# Patient Record
Sex: MALE | Race: WHITE | NOT HISPANIC OR LATINO | URBAN - METROPOLITAN AREA
[De-identification: names, ages, dates, MRNs, and addresses within clinical notes are randomized per-mention and may not be internally consistent; named-entity substitution may affect disease eponyms.]

---

## 2020-12-15 ENCOUNTER — OFFICE VISIT (OUTPATIENT)
Dept: URBAN - METROPOLITAN AREA CLINIC 63 | Facility: CLINIC | Age: 75
End: 2020-12-15

## 2021-01-12 ENCOUNTER — OFFICE VISIT (OUTPATIENT)
Dept: URBAN - METROPOLITAN AREA SURGERY CENTER 4 | Facility: SURGERY CENTER | Age: 76
End: 2021-01-12

## 2021-01-27 ENCOUNTER — OFFICE VISIT (OUTPATIENT)
Dept: URBAN - METROPOLITAN AREA CLINIC 63 | Facility: CLINIC | Age: 76
End: 2021-01-27

## 2021-02-05 ENCOUNTER — OFFICE VISIT (OUTPATIENT)
Dept: URBAN - METROPOLITAN AREA CLINIC 63 | Facility: CLINIC | Age: 76
End: 2021-02-05

## 2021-07-07 ENCOUNTER — OFFICE VISIT (OUTPATIENT)
Dept: URBAN - METROPOLITAN AREA MEDICAL CENTER 14 | Facility: MEDICAL CENTER | Age: 76
End: 2021-07-07

## 2021-09-01 ENCOUNTER — OFFICE VISIT (OUTPATIENT)
Dept: URBAN - METROPOLITAN AREA MEDICAL CENTER 14 | Facility: MEDICAL CENTER | Age: 76
End: 2021-09-01

## 2021-10-06 ENCOUNTER — OFFICE VISIT (OUTPATIENT)
Dept: URBAN - METROPOLITAN AREA MEDICAL CENTER 14 | Facility: MEDICAL CENTER | Age: 76
End: 2021-10-06

## 2021-10-19 ENCOUNTER — OFFICE VISIT (OUTPATIENT)
Dept: URBAN - METROPOLITAN AREA CLINIC 63 | Facility: CLINIC | Age: 76
End: 2021-10-19

## 2022-01-11 ENCOUNTER — OFFICE VISIT (OUTPATIENT)
Dept: URBAN - METROPOLITAN AREA SURGERY CENTER 4 | Facility: SURGERY CENTER | Age: 77
End: 2022-01-11

## 2022-01-25 ENCOUNTER — OFFICE VISIT (OUTPATIENT)
Dept: URBAN - METROPOLITAN AREA CLINIC 63 | Facility: CLINIC | Age: 77
End: 2022-01-25

## 2022-07-09 ENCOUNTER — TELEPHONE ENCOUNTER (OUTPATIENT)
Dept: URBAN - METROPOLITAN AREA CLINIC 121 | Facility: CLINIC | Age: 77
End: 2022-07-09

## 2022-07-09 RX ORDER — ASPIRIN 81 MG/1
TABLET, DELAYED RELEASE ORAL
Refills: 0 | OUTPATIENT
Start: 2018-03-01 | End: 2019-03-21

## 2022-07-09 RX ORDER — ASPIRIN 81 MG/1
TABLET, DELAYED RELEASE ORAL
Refills: 0 | OUTPATIENT
Start: 2017-06-13 | End: 2017-08-30

## 2022-07-09 RX ORDER — ASPIRIN/ACETAMINOPHEN/CAFFEINE 250-250-65
TABLET ORAL ONCE A DAY
Refills: 0 | OUTPATIENT
Start: 2017-08-30 | End: 2018-03-01

## 2022-07-09 RX ORDER — IBUPROFEN 200 MG
TABLET ORAL
Refills: 0 | OUTPATIENT
Start: 2017-08-30 | End: 2018-03-01

## 2022-07-09 RX ORDER — IBUPROFEN 200 MG
TABLET ORAL
Refills: 0 | OUTPATIENT
Start: 2017-02-10 | End: 2017-04-26

## 2022-07-09 RX ORDER — OMEPRAZOLE 20 MG/1
ONCE A DAY TABLET, DELAYED RELEASE ORAL ONCE A DAY
Refills: 0 | OUTPATIENT
Start: 2017-08-30 | End: 2018-03-01

## 2022-07-09 RX ORDER — ASPIRIN 81 MG/1
TABLET, DELAYED RELEASE ORAL
Refills: 0 | OUTPATIENT
Start: 2017-08-30 | End: 2018-03-01

## 2022-07-09 RX ORDER — IBUPROFEN 200 MG
TABLET ORAL
Refills: 0 | OUTPATIENT
Start: 2017-04-26 | End: 2017-06-13

## 2022-07-09 RX ORDER — ASPIRIN 81 MG/1
TABLET, DELAYED RELEASE ORAL
Refills: 0 | OUTPATIENT
Start: 2017-04-26 | End: 2017-06-13

## 2022-07-09 RX ORDER — ASPIRIN/ACETAMINOPHEN/CAFFEINE 250-250-65
TABLET ORAL ONCE A DAY
Refills: 0 | OUTPATIENT
Start: 2018-03-01 | End: 2019-03-21

## 2022-07-09 RX ORDER — IBUPROFEN 200 MG
TABLET ORAL
Refills: 0 | OUTPATIENT
Start: 2017-06-13 | End: 2017-08-30

## 2022-07-09 RX ORDER — ASPIRIN 81 MG/1
TABLET, DELAYED RELEASE ORAL
Refills: 0 | OUTPATIENT
Start: 2017-02-10 | End: 2017-04-26

## 2022-07-09 RX ORDER — IBUPROFEN 200 MG
TABLET ORAL
Refills: 0 | OUTPATIENT
Start: 2018-03-01 | End: 2019-03-21

## 2022-07-10 ENCOUNTER — TELEPHONE ENCOUNTER (OUTPATIENT)
Dept: URBAN - METROPOLITAN AREA CLINIC 121 | Facility: CLINIC | Age: 77
End: 2022-07-10

## 2022-07-10 RX ORDER — ONDANSETRON HYDROCHLORIDE 4 MG/1
USE AS DIRECTED TAKE ONE TABLET 30 MINUTES BEFORE EACH BOTTLE OF MAGNESIUM CITRATE FOR COLONOSCOPY PREP TABLET, FILM COATED ORAL
Refills: 0 | Status: ACTIVE | COMMUNITY
Start: 2021-10-19

## 2022-07-10 RX ORDER — ONDANSETRON HYDROCHLORIDE 4 MG/1
USE AS DIRECTED TAKE ONE TABLET 30 MINUTES BEFORE EACH BOTTLE OF MAGNESIUM CITRATE FOR COLONOSCOPY PREP TABLET, FILM COATED ORAL
Refills: 0 | Status: ACTIVE | COMMUNITY
Start: 2020-12-15

## 2023-06-28 PROBLEM — 429699009: Status: ACTIVE | Noted: 2023-06-28

## 2023-06-29 ENCOUNTER — OFFICE VISIT (OUTPATIENT)
Dept: URBAN - METROPOLITAN AREA CLINIC 63 | Facility: CLINIC | Age: 78
End: 2023-06-29
Payer: MEDICARE

## 2023-06-29 VITALS
OXYGEN SATURATION: 98 % | SYSTOLIC BLOOD PRESSURE: 102 MMHG | HEIGHT: 70 IN | WEIGHT: 152 LBS | TEMPERATURE: 97.9 F | BODY MASS INDEX: 21.76 KG/M2 | DIASTOLIC BLOOD PRESSURE: 62 MMHG | HEART RATE: 48 BPM

## 2023-06-29 DIAGNOSIS — Z15.09 LYNCH SYNDROME: ICD-10-CM

## 2023-06-29 DIAGNOSIS — Z85.038 PERSONAL HISTORY OF COLON CANCER: ICD-10-CM

## 2023-06-29 PROCEDURE — 99213 OFFICE O/P EST LOW 20 MIN: CPT | Performed by: PHYSICIAN ASSISTANT

## 2023-06-29 NOTE — HPI-TODAY'S VISIT:
Taqueria is a pleasant 77-year-old male who presents today for a follow up. History of Bartlett syndrome and T3N0 colon cancer status post laparoscopic converted to open right colectomy in April 2017 significant for mucinous adenocarcinoma.     Colonoscopy dated 1/11/2022 demonstrated nonbleeding internal hemorrhoids.  The colonic anastomosis is normal.  No specimens collected.  Repeat colonoscopy in 2 years per protocol.    EGD dated 10/6/2021 demonstrated diminutive gastric polyps.  Small hiatal hernia.  Questionable flat polyp in the apex of the duodenal bulb measuring 7 to 8 mm.  No obvious polyps seen on side-viewing duodenal scope.  Pathology demonstrated mild active duodenitis and Brunner gland hyperplasia.  Negative for malignancy or dysplasia.  Benign fundic gland polyps. Repeat in 2 years    Previously evaluated by Dr. Shepherd with work-up significant for poorly perfused areas of pancreas versus fatty infiltration with no further surveillance necessary.  Evaluated by genetic counseling and counseled on appropriate screening for family members.    Notes 1-2 daily bowel movements without constipation or diarrhea. Denies any nausea, vomiting, heartburn, reflux, dysphagia, odynophagia, hematemesis, coffee ground emesis, abdominal pain, change in bowel habits, abnormal weight loss, BRBPR or melena. Denies any family history of colon cancer or colon polyps. Notes no other GI complaints at this time

## 2023-07-06 ENCOUNTER — LAB OUTSIDE AN ENCOUNTER (OUTPATIENT)
Dept: URBAN - METROPOLITAN AREA CLINIC 63 | Facility: CLINIC | Age: 78
End: 2023-07-06

## 2023-10-04 ENCOUNTER — TELEPHONE ENCOUNTER (OUTPATIENT)
Dept: URBAN - METROPOLITAN AREA CLINIC 63 | Facility: CLINIC | Age: 78
End: 2023-10-04

## 2023-10-04 ENCOUNTER — OFFICE VISIT (OUTPATIENT)
Dept: URBAN - METROPOLITAN AREA MEDICAL CENTER 14 | Facility: MEDICAL CENTER | Age: 78
End: 2023-10-04
Payer: MEDICARE

## 2023-10-04 DIAGNOSIS — Z15.09 LYNCH SYNDROME: ICD-10-CM

## 2023-10-04 DIAGNOSIS — K31.7 POLYP OF STOMACH AND DUODENUM: ICD-10-CM

## 2023-10-04 DIAGNOSIS — K26.9 DUODENAL ULCERS: ICD-10-CM

## 2023-10-04 DIAGNOSIS — K29.00 ACUTE GASTRITIS WITHOUT BLEEDING: ICD-10-CM

## 2023-10-04 PROCEDURE — 43239 EGD BIOPSY SINGLE/MULTIPLE: CPT | Performed by: INTERNAL MEDICINE

## 2023-10-04 RX ORDER — OMEPRAZOLE 20 MG/1
1 TABLET 30 MINUTES BEFORE MORNING MEAL ONE CAPSULE 30MIN BEFORE DINNER TABLET, DELAYED RELEASE ORAL ONCE A DAY
Qty: 60 | Refills: 1 | OUTPATIENT
Start: 2023-10-04

## 2023-10-18 ENCOUNTER — DASHBOARD ENCOUNTERS (OUTPATIENT)
Age: 78
End: 2023-10-18

## 2023-10-18 ENCOUNTER — OFFICE VISIT (OUTPATIENT)
Dept: URBAN - METROPOLITAN AREA CLINIC 63 | Facility: CLINIC | Age: 78
End: 2023-10-18
Payer: MEDICARE

## 2023-10-18 VITALS
OXYGEN SATURATION: 98 % | WEIGHT: 149.6 LBS | HEIGHT: 70 IN | BODY MASS INDEX: 21.42 KG/M2 | HEART RATE: 60 BPM | SYSTOLIC BLOOD PRESSURE: 116 MMHG | TEMPERATURE: 97 F | DIASTOLIC BLOOD PRESSURE: 80 MMHG

## 2023-10-18 DIAGNOSIS — K27.9 PUD (PEPTIC ULCER DISEASE): ICD-10-CM

## 2023-10-18 PROCEDURE — 99214 OFFICE O/P EST MOD 30 MIN: CPT | Performed by: PHYSICIAN ASSISTANT

## 2023-10-18 RX ORDER — ONDANSETRON HYDROCHLORIDE 4 MG/1
1 TABLET TABLET, FILM COATED ORAL
Qty: 2 | Refills: 0 | OUTPATIENT
Start: 2023-10-18

## 2023-10-18 NOTE — HPI-TODAY'S VISIT:
Taqueria is a pleasant 77-year-old male who presents today for a procedure follow up. History of Bartlett syndrome and T3N0 colon cancer status post laparoscopic converted to open right colectomy in April 2017 significant for mucinous adenocarcinoma.    EGD dated 10/4/2023 showed evidence of duodenal polyps with normal papilla.  Gastric polyps.  Mild gastritis.  Small duodenal ulcers.  Omeprazole 20 mg twice daily.  Repeat EGD with side-viewing scope in 2 years.  Immunochemistry for H. pylori is negative.  Duodenal mucosa with intact villous architecture and Brunner's gland hyperplasia.  No increased intraepithelial lymphocytes.  Gastric antral type mucosa with reactive gastropathy and mild chronic gastritis.  Benign fundic gland polyp  Labs dated 10/4/2023 showed a normal CBC.  Normal renal function.   Colonoscopy dated 1/11/2022 demonstrated nonbleeding internal hemorrhoids.  The colonic anastomosis is normal.  No specimens collected.  Repeat colonoscopy in 2 years per protocol.    EGD dated 10/6/2021 demonstrated diminutive gastric polyps.  Small hiatal hernia.  Questionable flat polyp in the apex of the duodenal bulb measuring 7 to 8 mm.  No obvious polyps seen on side-viewing duodenal scope.  Pathology demonstrated mild active duodenitis and Brunner gland hyperplasia.  Negative for malignancy or dysplasia.  Benign fundic gland polyps. Repeat in 2 years    Previously evaluated by Dr. Shepherd with work-up significant for poorly perfused areas of pancreas versus fatty infiltration with no further surveillance necessary.  Evaluated by genetic counseling and counseled on appropriate screening for family members.    No issues after procedure. Did not start PPI yet. Notes 1-2 daily bowel movements without constipation or diarrhea. Denies any nausea, vomiting, heartburn, reflux, dysphagia, odynophagia, hematemesis, coffee ground emesis, abdominal pain, change in bowel habits, abnormal weight loss, BRBPR or melena. Denies any family history of colon cancer or colon polyps. Notes no other GI complaints at this time

## 2023-10-25 ENCOUNTER — LAB OUTSIDE AN ENCOUNTER (OUTPATIENT)
Dept: URBAN - METROPOLITAN AREA CLINIC 63 | Facility: CLINIC | Age: 78
End: 2023-10-25

## 2023-10-26 ENCOUNTER — TELEPHONE ENCOUNTER (OUTPATIENT)
Dept: URBAN - METROPOLITAN AREA CLINIC 64 | Facility: CLINIC | Age: 78
End: 2023-10-26

## 2024-01-11 ENCOUNTER — OFFICE VISIT (OUTPATIENT)
Dept: URBAN - METROPOLITAN AREA SURGERY CENTER 4 | Facility: SURGERY CENTER | Age: 79
End: 2024-01-11

## 2024-01-24 ENCOUNTER — OFFICE VISIT (OUTPATIENT)
Dept: URBAN - METROPOLITAN AREA CLINIC 63 | Facility: CLINIC | Age: 79
End: 2024-01-24

## 2024-01-30 ENCOUNTER — OFFICE VISIT (OUTPATIENT)
Dept: URBAN - METROPOLITAN AREA SURGERY CENTER 4 | Facility: SURGERY CENTER | Age: 79
End: 2024-01-30

## 2024-03-26 ENCOUNTER — COLON (OUTPATIENT)
Dept: URBAN - METROPOLITAN AREA SURGERY CENTER 4 | Facility: SURGERY CENTER | Age: 79
End: 2024-03-26

## 2024-04-18 ENCOUNTER — COLON (OUTPATIENT)
Dept: URBAN - METROPOLITAN AREA SURGERY CENTER 4 | Facility: SURGERY CENTER | Age: 79
End: 2024-04-18
Payer: MEDICARE

## 2024-04-18 DIAGNOSIS — K64.8 OTHER HEMORRHOIDS: ICD-10-CM

## 2024-04-18 DIAGNOSIS — Z15.09 GENETIC SUSCEPTIBILITY TO OTHER MALIGNANT NEOPLASM: ICD-10-CM

## 2024-04-18 DIAGNOSIS — Z12.11 ENCOUNTER FOR SCREENING FOR MALIGNANT NEOPLASM OF COLON: ICD-10-CM

## 2024-04-18 DIAGNOSIS — K57.30 DIVERTICULOSIS OF LARGE INTESTINE WITHOUT PERFORATION OR ABSCESS WITHOUT BLEEDING: ICD-10-CM

## 2024-04-18 PROCEDURE — G0121 COLON CA SCRN NOT HI RSK IND: HCPCS | Performed by: INTERNAL MEDICINE

## 2024-04-18 RX ORDER — ONDANSETRON HYDROCHLORIDE 4 MG/1
1 TABLET TABLET, FILM COATED ORAL
Qty: 2 | Refills: 0 | Status: ACTIVE | COMMUNITY
Start: 2023-10-18